# Patient Record
Sex: MALE | NOT HISPANIC OR LATINO
[De-identification: names, ages, dates, MRNs, and addresses within clinical notes are randomized per-mention and may not be internally consistent; named-entity substitution may affect disease eponyms.]

---

## 2022-10-03 ENCOUNTER — NON-APPOINTMENT (OUTPATIENT)
Age: 42
End: 2022-10-03

## 2022-10-03 PROBLEM — Z00.00 ENCOUNTER FOR PREVENTIVE HEALTH EXAMINATION: Status: ACTIVE | Noted: 2022-10-03

## 2022-10-04 ENCOUNTER — APPOINTMENT (OUTPATIENT)
Dept: OTOLARYNGOLOGY | Facility: CLINIC | Age: 42
End: 2022-10-04

## 2022-10-04 VITALS — HEIGHT: 70 IN | BODY MASS INDEX: 26.05 KG/M2 | TEMPERATURE: 96.9 F | WEIGHT: 182 LBS

## 2022-10-04 DIAGNOSIS — Z15.89 GENETIC SUSCEPTIBILITY TO OTHER DISEASE: ICD-10-CM

## 2022-10-04 DIAGNOSIS — Z78.9 OTHER SPECIFIED HEALTH STATUS: ICD-10-CM

## 2022-10-04 DIAGNOSIS — Z72.89 OTHER PROBLEMS RELATED TO LIFESTYLE: ICD-10-CM

## 2022-10-04 DIAGNOSIS — Z82.2 FAMILY HISTORY OF DEAFNESS AND HEARING LOSS: ICD-10-CM

## 2022-10-04 DIAGNOSIS — H91.21 SUDDEN IDIOPATHIC HEARING LOSS, RIGHT EAR: ICD-10-CM

## 2022-10-04 PROCEDURE — 92557 COMPREHENSIVE HEARING TEST: CPT

## 2022-10-04 PROCEDURE — 92567 TYMPANOMETRY: CPT

## 2022-10-04 PROCEDURE — 99204 OFFICE O/P NEW MOD 45 MIN: CPT

## 2022-10-04 RX ORDER — DEXTROAMPHETAMINE SACCHARATE, AMPHETAMINE ASPARTATE, DEXTROAMPHETAMINE SULFATE, AND AMPHETAMINE SULFATE 3.75; 3.75; 3.75; 3.75 MG/1; MG/1; MG/1; MG/1
TABLET ORAL
Refills: 0 | Status: ACTIVE | COMMUNITY

## 2022-10-04 RX ORDER — ASPIRIN 81 MG
81 TABLET, DELAYED RELEASE (ENTERIC COATED) ORAL
Refills: 0 | Status: ACTIVE | COMMUNITY

## 2022-10-04 NOTE — HISTORY OF PRESENT ILLNESS
[de-identified] : JOVANNI BRADEN has a history of hearing loss since 2018 in the left ear after air travel.  Some dizziness but not disabling.  Treated with amplification but did not use it much.\par Increased tinnitus in the left ear for the past few weeks. Possible worsening of hearing in the left ear. More sensitive to noise. No recent dizziness.\par MRI 2018, IAC: normal\par Past history of Myeloproliferative neoplasia on ASA.

## 2022-10-04 NOTE — DATA REVIEWED
[de-identified] : In light of the patients current symptoms, Complete audiometry was ordered and completed today. I have interpreted these results and reviewed them in detail with the patient.\par \par Severe sensorineural hearing loss in the left ear.  This testing is compared to prior testing provided from 2021 and 2018 [de-identified] : MRI 2018 reviewed

## 2022-10-04 NOTE — ASSESSMENT
[FreeTextEntry1] : Evidence of sudden hearing loss occurring approximately 2 weeks ago.  This exacerbates a pre-existing sudden hearing loss which may have been progressive since 2018.  The etiology for this stepwise sudden hearing loss is uncertain but may be related to his history of myeloproliferative disease.  Question of a hypercoagulable state.\par \par I have reviewed this with the patient in detail.  We discussed etiologies.  I have recommended MRI to rule out a mass lesion.  I have recommended that he consult his hematologist for the possible relationship to his myeloproliferative disease.\par \par I have recommended a trial with oral steroids.  Risks and benefits were reviewed.  I have recommended follow-up in approximately 3 weeks with repeat audiometry.  Further intervention including intratympanic steroids may be considered.

## 2022-10-04 NOTE — CONSULT LETTER
[Please see my note below.] : Please see my note below. [DrFransico  ___] : Dr. LONG [FreeTextEntry2] : Dear JACI GARBER  [FreeTextEntry1] : Thank you for allowing me to participate in the care of JOVANNI BRADEN .\par Please see the attached visit note.\par \par \par \par Guerrero Lira\par Otology\par Medical Director [facility]\par Department of Otolaryngology\par [facility]

## 2022-11-01 ENCOUNTER — TRANSCRIPTION ENCOUNTER (OUTPATIENT)
Age: 42
End: 2022-11-01

## 2022-11-07 ENCOUNTER — APPOINTMENT (OUTPATIENT)
Dept: OTOLARYNGOLOGY | Facility: CLINIC | Age: 42
End: 2022-11-07

## 2022-11-07 PROCEDURE — 99213 OFFICE O/P EST LOW 20 MIN: CPT

## 2022-11-07 PROCEDURE — 92557 COMPREHENSIVE HEARING TEST: CPT

## 2022-11-07 PROCEDURE — 92567 TYMPANOMETRY: CPT

## 2022-11-07 RX ORDER — PREDNISONE 10 MG/1
10 TABLET ORAL
Qty: 40 | Refills: 0 | Status: COMPLETED | COMMUNITY
Start: 2022-10-04 | End: 2022-10-18

## 2022-11-07 NOTE — CONSULT LETTER
[Please see my note below.] : Please see my note below. [FreeTextEntry2] : Dear JACI GARBER  [FreeTextEntry1] : Thank you for allowing me to participate in the care of JOVANNI BRADEN .\par Please see the attached visit note.\par \par \par \par Guerrero Lira\par Otology\par Medical Director [facility]\par Department of Otolaryngology\par [facility]

## 2022-11-07 NOTE — DATA REVIEWED
[de-identified] : In light of the patients current symptoms, Complete audiometry was ordered and completed today. I have interpreted these results and reviewed them in detail with the patient.\par \par Slightly improved hearing loss in the left ear. [de-identified] : 2022 MRI results reviewed.

## 2022-11-07 NOTE — ASSESSMENT
[FreeTextEntry1] : Sudden worsening of pre-existing sensorineural hearing loss in the left ear beginning or occurring in September 2022.  This does not appear to have responded to oral steroids although there is a slight improvement identified today.  We discussed management strategies in detail.  Watchful waiting recommended selected, with consideration for amplification and/or implantation for hearing restoration depending on further testing results.\par \par Follow-up recommended in 2 months with repeat audiometry

## 2022-11-07 NOTE — HISTORY OF PRESENT ILLNESS
[de-identified] : JOVANNI BRADEN has a history of hearing loss since 2018 in the left ear after air travel.  Some dizziness but not disabling.  Treated with amplification but did not use it much.\par Increased tinnitus in the left ear with hearing loss in or about mid September 2022 \par \par MRI 2018, IAC: normal\par Past history of Myeloproliferative neoplasia on ASA.  [FreeTextEntry1] : 11/07/2022 \par Possibly some improvement noted.  compliant with medications with no apparent adverse affects.  still has tinnitus. NO dizziness.

## 2022-12-19 ENCOUNTER — APPOINTMENT (OUTPATIENT)
Dept: OTOLARYNGOLOGY | Facility: CLINIC | Age: 42
End: 2022-12-19

## 2022-12-19 VITALS — WEIGHT: 182 LBS | HEIGHT: 70 IN | BODY MASS INDEX: 26.05 KG/M2

## 2022-12-19 DIAGNOSIS — H93.299 OTHER ABNORMAL AUDITORY PERCEPTIONS, UNSPECIFIED EAR: ICD-10-CM

## 2022-12-19 DIAGNOSIS — H91.22 SUDDEN IDIOPATHIC HEARING LOSS, LEFT EAR: ICD-10-CM

## 2022-12-19 PROCEDURE — 92550 TYMPANOMETRY & REFLEX THRESH: CPT | Mod: 52

## 2022-12-19 PROCEDURE — 99213 OFFICE O/P EST LOW 20 MIN: CPT

## 2022-12-19 PROCEDURE — 92557 COMPREHENSIVE HEARING TEST: CPT

## 2022-12-19 NOTE — ASSESSMENT
[FreeTextEntry1] : Management strategies discussed in detail.  Continued amplification in the left ear suggested.\par \par Careful monitoring of right-sided hearing recommended.  Repeat testing in 6 months or sooner if needed changes occur.

## 2022-12-19 NOTE — CONSULT LETTER
[Please see my note below.] : Please see my note below. [FreeTextEntry2] : Dear JACI GARBER  [FreeTextEntry1] : Thank you for allowing me to participate in the care of JOVANNI BRADEN .\par Please see the attached visit note.\par \par \par \par Guerrero Lira\par Otology\par Medical Director [facility]\par Department of Otolaryngology\par [facility]

## 2022-12-19 NOTE — DATA REVIEWED
[de-identified] : In light of the patients current symptoms, Complete audiometry was ordered and completed today. I have interpreted these results and reviewed them in detail with the patient.\par \par Possible slight worsening in the right ear

## 2022-12-19 NOTE — HISTORY OF PRESENT ILLNESS
[de-identified] : JOVANNI BRADEN has a history of hearing loss since 2018 in the left ear after air travel.  Some dizziness but not disabling.  Treated with amplification but did not use it much.\par Increased tinnitus in the left ear with hearing loss in or about mid September 2022 \par \par MRI 2018, IAC: normal\par Past history of Myeloproliferative neoplasia on ASA.  [FreeTextEntry1] : 12/19/2022 \par No change in hearing.  Using amplification infrequently. Tinnitus less bothersome. No vertigo.

## 2023-06-06 ENCOUNTER — APPOINTMENT (OUTPATIENT)
Dept: OTOLARYNGOLOGY | Facility: CLINIC | Age: 43
End: 2023-06-06
Payer: COMMERCIAL

## 2023-06-06 VITALS — WEIGHT: 182 LBS | HEIGHT: 70 IN | BODY MASS INDEX: 26.05 KG/M2

## 2023-06-06 DIAGNOSIS — H90.3 SENSORINEURAL HEARING LOSS, BILATERAL: ICD-10-CM

## 2023-06-06 DIAGNOSIS — H90.42 SENSORINEURAL HEARING LOSS, UNILATERAL, LEFT EAR, WITH UNRESTRICTED HEARING ON THE CONTRALATERAL SIDE: ICD-10-CM

## 2023-06-06 DIAGNOSIS — H61.20 IMPACTED CERUMEN, UNSPECIFIED EAR: ICD-10-CM

## 2023-06-06 PROCEDURE — 99213 OFFICE O/P EST LOW 20 MIN: CPT | Mod: 25

## 2023-06-06 PROCEDURE — 92567 TYMPANOMETRY: CPT

## 2023-06-06 PROCEDURE — 92557 COMPREHENSIVE HEARING TEST: CPT

## 2023-06-06 PROCEDURE — G0268 REMOVAL OF IMPACTED WAX MD: CPT

## 2023-06-06 NOTE — DATA REVIEWED
[de-identified] : In light of the patients current symptoms, Complete audiometry was ordered and completed today. I have interpreted these results and reviewed them in detail with the patient.\par \par Sensorineural hearing loss affecting the left ear.  Mild loss in the right.  This appears to be stable.

## 2023-06-06 NOTE — ASSESSMENT
[FreeTextEntry1] : Mostly stable hearing loss noted.  Careful monitoring recommended.  Early follow-up if symptoms change.  Noise protection recommended.\par \par I have recommended follow-up with audiology for hearing technology adjustments as needed.

## 2023-06-08 PROBLEM — H90.3 SNHL (SENSORY-NEURAL HEARING LOSS), ASYMMETRICAL: Status: ACTIVE | Noted: 2022-10-04

## 2023-06-08 PROBLEM — H90.42 SENSORINEURAL HEARING LOSS (SNHL) OF LEFT EAR WITH UNRESTRICTED HEARING OF RIGHT EAR: Status: ACTIVE | Noted: 2022-10-04

## 2024-06-18 ENCOUNTER — APPOINTMENT (OUTPATIENT)
Dept: OTOLARYNGOLOGY | Facility: CLINIC | Age: 44
End: 2024-06-18